# Patient Record
Sex: MALE | Race: BLACK OR AFRICAN AMERICAN | NOT HISPANIC OR LATINO | URBAN - METROPOLITAN AREA
[De-identification: names, ages, dates, MRNs, and addresses within clinical notes are randomized per-mention and may not be internally consistent; named-entity substitution may affect disease eponyms.]

---

## 2018-11-14 ENCOUNTER — EMERGENCY (EMERGENCY)
Facility: HOSPITAL | Age: 38
LOS: 0 days | Discharge: LEFT BEFORE DISCHARGE | End: 2018-11-14
Admitting: EMERGENCY MEDICINE

## 2018-11-14 VITALS
OXYGEN SATURATION: 98 % | SYSTOLIC BLOOD PRESSURE: 134 MMHG | RESPIRATION RATE: 19 BRPM | TEMPERATURE: 98 F | HEART RATE: 74 BPM | DIASTOLIC BLOOD PRESSURE: 68 MMHG

## 2018-11-14 DIAGNOSIS — R07.89 OTHER CHEST PAIN: ICD-10-CM

## 2019-03-23 ENCOUNTER — EMERGENCY (EMERGENCY)
Facility: HOSPITAL | Age: 39
LOS: 0 days | Discharge: LEFT BEFORE DISCHARGE | End: 2019-03-23
Attending: EMERGENCY MEDICINE | Admitting: EMERGENCY MEDICINE

## 2019-03-23 VITALS
RESPIRATION RATE: 18 BRPM | DIASTOLIC BLOOD PRESSURE: 77 MMHG | HEART RATE: 66 BPM | OXYGEN SATURATION: 96 % | TEMPERATURE: 98 F | SYSTOLIC BLOOD PRESSURE: 125 MMHG

## 2019-03-23 DIAGNOSIS — R42 DIZZINESS AND GIDDINESS: ICD-10-CM

## 2019-03-23 NOTE — ED ADULT TRIAGE NOTE - CHIEF COMPLAINT QUOTE
Dizziness, lightheadedness, and blurred vision intermittent for 90 minutes. Neuro status intact. Cleared by critical care attending.

## 2020-10-17 ENCOUNTER — EMERGENCY (EMERGENCY)
Facility: HOSPITAL | Age: 40
LOS: 0 days | Discharge: HOME | End: 2020-10-17
Attending: EMERGENCY MEDICINE | Admitting: EMERGENCY MEDICINE
Payer: MEDICAID

## 2020-10-17 VITALS
HEART RATE: 69 BPM | RESPIRATION RATE: 18 BRPM | DIASTOLIC BLOOD PRESSURE: 60 MMHG | WEIGHT: 201.94 LBS | TEMPERATURE: 98 F | OXYGEN SATURATION: 98 % | SYSTOLIC BLOOD PRESSURE: 107 MMHG

## 2020-10-17 DIAGNOSIS — Z87.891 PERSONAL HISTORY OF NICOTINE DEPENDENCE: ICD-10-CM

## 2020-10-17 DIAGNOSIS — R60.0 LOCALIZED EDEMA: ICD-10-CM

## 2020-10-17 DIAGNOSIS — L03.114 CELLULITIS OF LEFT UPPER LIMB: ICD-10-CM

## 2020-10-17 PROCEDURE — 99283 EMERGENCY DEPT VISIT LOW MDM: CPT

## 2020-10-17 NOTE — ED PROVIDER NOTE - OBJECTIVE STATEMENT
41 yo male with no pertinent pmh present for bug bite to L wrist. pt states he was bit 2 nights prior and initially only noted itching but upon waking up this morning noted edema/erythema. pt denies any pain/discharge from the area. denies any other symptoms including fevers, chill, headache, recent illness/travel, cough, abdominal pain, chest pain, or SOB.

## 2020-10-17 NOTE — ED PROVIDER NOTE - PATIENT PORTAL LINK FT
You can access the FollowMyHealth Patient Portal offered by [facility] by registering at the following website: [email or web address]. By joining [employer]’s FollowMyHealth portal, you will also be able to view your health information using other applications (apps) compatible with our system.

## 2020-10-17 NOTE — ED PROVIDER NOTE - ATTENDING CONTRIBUTION TO CARE
39yo M with no sig PMHx presents for eval of left forearm redness and swelling. Pt states got a bug bite 2 days ago in that area, initially not itchy or painful. Today when he woke up noticed redness and swelling worse, still no pain. Denies fever. Denies numbness, tingling. No h/o travel including to wooded areas.    Vital signs reviewed  GENERAL: Patient nontoxic appearing, NAD  MUSCULOSKELETAL/EXTREMITIES: Brisk cap refill. Equal radial pulses.   SKIN:  Minimal erythema of distal left forearm, volar aspect, with mild swelling, no fluctuance or induration, not hot to touch, nontender. Central area of excoriation. No vesicles or ulceration.

## 2020-10-17 NOTE — ED PROVIDER NOTE - CLINICAL SUMMARY MEDICAL DECISION MAKING FREE TEXT BOX
41yo M with forearm redness and swelling in context of bug bite. Impression is mild cellulitis. Will give ABx. Return precautions given.

## 2020-10-17 NOTE — ED PROVIDER NOTE - PHYSICAL EXAMINATION
Gen: NAD, AOx3  Head: NCAT  HEENT: PERRL, oral mucosa moist, normal conjunctiva, oropharynx clear without exudate or erythema  Lung: CTAB, no respiratory distress, no wheezing, rales, rhonchi  CV: normal s1/s2, rrr, Normal perfusion, pulses 2+ throughout  Abd: soft, NTND, no CVA tenderness  MSK: No edema, no visible deformities, full range of motion in all 4 extremities  Neuro: No focal neurologic deficits  Skin: edema/erythema to L lateral forearm w/ no TTP/drainage   Psych: normal affect

## 2020-11-11 ENCOUNTER — APPOINTMENT (OUTPATIENT)
Dept: CARDIOLOGY | Facility: CLINIC | Age: 40
End: 2020-11-11
Payer: MEDICAID

## 2020-11-11 VITALS
HEART RATE: 65 BPM | HEIGHT: 69 IN | BODY MASS INDEX: 27.25 KG/M2 | DIASTOLIC BLOOD PRESSURE: 80 MMHG | SYSTOLIC BLOOD PRESSURE: 110 MMHG | WEIGHT: 184 LBS

## 2020-11-11 PROBLEM — Z00.00 ENCOUNTER FOR PREVENTIVE HEALTH EXAMINATION: Status: ACTIVE | Noted: 2020-11-11

## 2020-11-11 PROCEDURE — 93000 ELECTROCARDIOGRAM COMPLETE: CPT

## 2020-11-11 PROCEDURE — 99072 ADDL SUPL MATRL&STAF TM PHE: CPT

## 2020-11-11 PROCEDURE — 99203 OFFICE O/P NEW LOW 30 MIN: CPT

## 2020-11-12 DIAGNOSIS — Z91.89 OTHER SPECIFIED PERSONAL RISK FACTORS, NOT ELSEWHERE CLASSIFIED: ICD-10-CM

## 2020-11-19 ENCOUNTER — APPOINTMENT (OUTPATIENT)
Dept: CARDIOLOGY | Facility: CLINIC | Age: 40
End: 2020-11-19
Payer: MEDICAID

## 2020-11-19 PROCEDURE — 93306 TTE W/DOPPLER COMPLETE: CPT

## 2021-10-28 ENCOUNTER — EMERGENCY (EMERGENCY)
Facility: HOSPITAL | Age: 41
LOS: 0 days | Discharge: HOME | End: 2021-10-28
Attending: EMERGENCY MEDICINE | Admitting: EMERGENCY MEDICINE
Payer: MEDICAID

## 2021-10-28 VITALS
SYSTOLIC BLOOD PRESSURE: 128 MMHG | RESPIRATION RATE: 18 BRPM | TEMPERATURE: 97 F | DIASTOLIC BLOOD PRESSURE: 66 MMHG | HEART RATE: 75 BPM | OXYGEN SATURATION: 99 %

## 2021-10-28 VITALS — HEIGHT: 69 IN | WEIGHT: 177.91 LBS

## 2021-10-28 DIAGNOSIS — S91.301A UNSPECIFIED OPEN WOUND, RIGHT FOOT, INITIAL ENCOUNTER: ICD-10-CM

## 2021-10-28 DIAGNOSIS — Z79.891 LONG TERM (CURRENT) USE OF OPIATE ANALGESIC: ICD-10-CM

## 2021-10-28 DIAGNOSIS — R09.89 OTHER SPECIFIED SYMPTOMS AND SIGNS INVOLVING THE CIRCULATORY AND RESPIRATORY SYSTEMS: ICD-10-CM

## 2021-10-28 DIAGNOSIS — B35.3 TINEA PEDIS: ICD-10-CM
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
## 2021-10-28 PROCEDURE — 99284 EMERGENCY DEPT VISIT MOD MDM: CPT

## 2021-10-28 PROCEDURE — 71046 X-RAY EXAM CHEST 2 VIEWS: CPT | Mod: 26

## 2021-10-28 NOTE — ED PROVIDER NOTE - NSFOLLOWUPCLINICS_GEN_ALL_ED_FT
[facility]  Medicine  [address], [place]   Phone: [phone]  Fax:   Follow Up Time: 1-3 Days

## 2021-10-28 NOTE — ED PROVIDER NOTE - PROVIDER TOKENS
FREE:[LAST:[Please make sure to follow up with your dermatologist appointment that you have scheduled],PHONE:[(   )    -],FAX:[(   )    -]]

## 2021-10-28 NOTE — ED PROVIDER NOTE - PHYSICAL EXAMINATION
CONSTITUTIONAL: Well-appearing; well-nourished; in no apparent distress.   HEAD: Normocephalic; atraumatic.   EYES: Pupils are round and reactive, extra-ocular muscles are intact. Eyelids are normal in appearance without swelling or lesions.   ENT: External ears are non-tender and without swelling or erythema. Hearing is intact with good acuity to spoken voice. Patient is speaking clearly, not muffled and airway is intact.   NECK: Neck is supple without adenopathy. Trachea is midline.   RESPIRATORY: Chest wall is symmetric without deformity. No signs of respiratory distress. Lung sounds are clear in all lobes bilaterally without rales, rhonchi, or wheezes.  CARDIOVASCULAR: Regular rate and rhythm. Normal peripheral perfusion.   GI: Abdomen is soft, non-tender, and without distention. Bowel sounds are present and normoactive in all four quadrants. No masses are noted. No rebound, guarding, or rigidity.  BACK: No evidence of trauma or deformity. No midline tenderness. Normal ROM.   EXT: There is a [id number] wound on the medial side of the R foot; there is no active bleeding or drainage or surrounding erythema; motor and sensory function intact with full ROM; distal pulse present. Steady gait noted.  NEURO: A & O x 4. Normal speech. Motor function is normal with good muscle strength to upper and lower extremities. Sensation is intact to all extremities. Cranial nerves are intact.  PSYCHOLOGICAL: Appropriate mood and affect. Good judgement and insight. No visual or auditory hallucinations. No suicidal or homicidal ideation.

## 2021-10-28 NOTE — ED PROVIDER NOTE - NSFOLLOWUPINSTRUCTIONS_ED_ALL_ED_FT
Athlete's Foot       Athlete's foot (tinea pedis) is a fungal infection of the skin on your feet. It often occurs on the skin that is between or underneath the toes. It can also occur on the soles of your feet. The infection can spread from person to person (is contagious). It can also spread when a person's bare feet come in contact with the fungus on shower floors or on items such as shoes.      What are the causes?    This condition is caused by a fungus that grows in warm, moist places. You can get athlete's foot by sharing shoes, shower stalls, towels, and wet floors with someone who is infected. Not washing your feet or changing your socks often enough can also lead to athlete's foot.      What increases the risk?  This condition is more likely to develop in:  •Men.      •People who have a weak body defense system (immune system).      •People who have diabetes.      •People who use public showers, such as at a gym.      •People who wear heavy-duty shoes, such as industrial or  shoes.      •Seasons with warm, humid weather.        What are the signs or symptoms?  Symptoms of this condition include:  •Itchy areas between your toes or on the soles of your feet.      •White, flaky, or scaly areas between your toes or on the soles of your feet.      •Very itchy small blisters between your toes or on the soles of your feet.      •Small cuts in your skin. These cuts can become infected.      •Thick or discolored toenails.        How is this diagnosed?    This condition may be diagnosed with a physical exam and a review of your medical history. Your health care provider may also take a skin or toenail sample to examine under a microscope.      How is this treated?    This condition is treated with antifungal medicines. These may be applied as powders, ointments, or creams. In severe cases, an oral antifungal medicine may be given.      Follow these instructions at home:    Medicines     •Apply or take over-the-counter and prescription medicines only as told by your health care provider.      •Apply your antifungal medicine as told by your health care provider. Do not stop using the antifungal even if your condition improves.      Foot care     • Do not scratch your feet.    •Keep your feet dry:  •Wear cotton or wool socks. Change your socks every day or if they become wet.      •Wear shoes that allow air to flow, such as sandals or canvas tennis shoes.      •Wash and dry your feet, including the area between your toes. Also, wash and dry your feet:  •Every day or as told by your health care provider.      •After exercising.        General instructions     • Do not let others use towels, shoes, nail clippers, or other personal items that touch your feet.      •Protect your feet by wearing sandals in wet areas, such as locker rooms and shared showers.      •Keep all follow-up visits as told by your health care provider. This is important.      •If you have diabetes, keep your blood sugar under control.        Contact a health care provider if:    •You have a fever.      •You have swelling, soreness, warmth, or redness in your foot.      •Your feet are not getting better with treatment.      •Your symptoms get worse.      •You have new symptoms.        Summary    •Athlete's foot (tinea pedis) is a fungal infection of the skin on your feet. It often occurs on skin that is between or underneath the toes.      •This condition is caused by a fungus that grows in warm, moist places.      •Symptoms include white, flaky, or scaly areas between your toes or on the soles of your feet.      •This condition is treated with antifungal medicines.      •Keep your feet clean. Always dry them thoroughly.      This information is not intended to replace advice given to you by your health care provider. Make sure you discuss any questions you have with your health care provider.
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
## 2021-10-28 NOTE — ED PROVIDER NOTE - OBJECTIVE STATEMENT
42 y/o male with no significant PMH who presents with chest congestion and right foot wound. 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 Reports that he started this job 2 months ago. Reports that 3 days ago he was wearing a regular mask instead of his usual mask for his job. Reports afterwards noticing chest congestion since then. Denies fever, headache, lightheadedness, SOB, chest pain, N/V, and abdominal pain. Also reports that he was using water to flush the trash debris almost 1 month ago and got water into his right shoe. Reports developing a wound a few days after. Reports that only feels pain when his wears his shoes and rubs on it. Denies active bleeding, discharge, and other symptoms. Reports that he used hydrogen peroxide to clean it, but it has not improved.

## 2021-10-28 NOTE — ED PROVIDER NOTE - PATIENT PORTAL LINK FT
You can access the FollowMyHealth Patient Portal offered by [facility] by registering at the following website: [email or web address]. By joining [employer]’s FollowMyHealth portal, you will also be able to view your health information using other applications (apps) compatible with our system.

## 2021-10-28 NOTE — ED ADULT NURSE NOTE - OBJECTIVE STATEMENT
pt comes in with multiple medical complaints. pt states he is feeling congestion after being exposed to chemicals from work. also states he has had trouble using the bathroom. took over the counter medications with no relief. LBM this morning. Pt also has a rash/injury on his right lateral foot, open to air, treated at home with peroxide.

## 2021-10-28 NOTE — ED PROVIDER NOTE - CARE PROVIDER_API CALL
Please make sure to follow up with your dermatologist appointment that you have scheduled,   Phone: (   )    -  Fax: (   )    -  Follow Up Time:

## 2021-10-28 NOTE — ED PROVIDER NOTE - NS ED ROS FT
Constitutional: Negative for fever, chills, weight change, and fatigue.  HENT: Negative for headache, hearing change, ear pain, nasal congestion, and sore throat.  Eyes: Negative for eye pain, eye discharge, foreign body sensation, and vision change.  Cardiovascular: Negative for chest pain, palpitation, and orthopnea.  Respiratory: + chest congestion. Negative for SOB, wheezing, cough and sputum production.  Gastrointestinal: Negative for nausea, vomiting, abdominal pain, constipation, diarrhea, hematochezia, and melena.  Genitourinary: Negative for flank pain, dysuria, urgency, frequency, hematuria, and urinary retention.  Neurological: Negative for dizziness, syncope, focal weakness, numbness, and loss of consciousness.  Musculoskeletal: + wound on R foot. Negative for joint swelling, arthralgias, back pain, neck pain, and calf cramps.  Hematological: Negative for adenopathy. Does not bruise/bleed easily.  Psychiatric/Behavioral: Negative for anxiety/panic, depression, delusions, SI/HI, and memory changes.

## 2021-10-28 NOTE — ED PROVIDER NOTE - CLINICAL SUMMARY MEDICAL DECISION MAKING FREE TEXT BOX
42yo M presenting with R foot wound/rash- states he had moisture in area, developed rash, now improving but still present. has appt with derm next month. also c/o some chest congestion likely also work related per pt. no fevers/chills, cp. Well appearing, NAD, non toxic. NCAT PERRLA EOMI neck supple non tender normal wob cta bl rrr abdomen s nt nd no rebound no guarding [id number] neuro non focal R medial foot dry scaly rash, no surrounding erythema warmth tenderness crepitus induration drainage

## 2021-11-10 ENCOUNTER — EMERGENCY (EMERGENCY)
Facility: HOSPITAL | Age: 41
LOS: 0 days | Discharge: HOME | End: 2021-11-10
Attending: EMERGENCY MEDICINE | Admitting: EMERGENCY MEDICINE
Payer: MEDICAID

## 2021-11-10 VITALS
DIASTOLIC BLOOD PRESSURE: 72 MMHG | HEART RATE: 72 BPM | SYSTOLIC BLOOD PRESSURE: 139 MMHG | WEIGHT: 179.9 LBS | HEIGHT: 69 IN | OXYGEN SATURATION: 98 % | RESPIRATION RATE: 17 BRPM | TEMPERATURE: 98 F

## 2021-11-10 DIAGNOSIS — B35.3 TINEA PEDIS: ICD-10-CM

## 2021-11-10 DIAGNOSIS — R21 RASH AND OTHER NONSPECIFIC SKIN ERUPTION: ICD-10-CM

## 2021-11-10 PROCEDURE — 99282 EMERGENCY DEPT VISIT SF MDM: CPT

## 2021-11-10 NOTE — ED PROVIDER NOTE - PHYSICAL EXAMINATION
CONST: Well appearing in NAD  MS: Normal ROM in all extremities. No midline spinal tenderness.  SKIN: right foot plantar surface with circular rash with sharp raised borders and centered crusting and peelng skin c/e fungal rash. no cellulitis or abscess  NEURO: A&Ox3, No focal deficits. Strength 5/5 with no sensory deficits. Steady gait

## 2021-11-10 NOTE — ED PROVIDER NOTE - NS ED ROS FT
CONST: No fever, chills or bodyaches  EYES: No pain, redness, drainage or visual changes.  ENT: No ear pain or discharge, nasal discharge or congestion. No sore throat  CARD: No chest pain, palpitations  RESP: No SOB, cough, hemoptysis.   GI: No abdominal pain, N/V/D  NEURO: No headache, dizziness, paresthesias or LOC

## 2021-11-10 NOTE — ED ADULT TRIAGE NOTE - CHIEF COMPLAINT QUOTE
pt states that he was seen for a rash on his right foot last week and given cream but now the skin is raw and falling off.

## 2021-11-10 NOTE — ED PROVIDER NOTE - OBJECTIVE STATEMENT
rash to right foot x 2 weeks. Some improvement with Clotrimazole cream but the dressings stick to the wound and causes irritation. Denies fever, chills. Has appt with dermatology at the end of november

## 2021-11-10 NOTE — ED PROVIDER NOTE - PATIENT PORTAL LINK FT
You can access the FollowMyHealth Patient Portal offered by [facility] by registering at the following website: [email or web address]. By joining [employer]’s FollowMyHealth portal, you will also be able to view your health information using other applications (apps) compatible with our system.

## 2021-12-30 ENCOUNTER — EMERGENCY (EMERGENCY)
Facility: HOSPITAL | Age: 41
LOS: 0 days | Discharge: HOME | End: 2021-12-30
Attending: STUDENT IN AN ORGANIZED HEALTH CARE EDUCATION/TRAINING PROGRAM | Admitting: STUDENT IN AN ORGANIZED HEALTH CARE EDUCATION/TRAINING PROGRAM
Payer: OTHER MISCELLANEOUS

## 2021-12-30 VITALS
HEIGHT: 69 IN | DIASTOLIC BLOOD PRESSURE: 77 MMHG | HEART RATE: 68 BPM | TEMPERATURE: 97 F | SYSTOLIC BLOOD PRESSURE: 131 MMHG | RESPIRATION RATE: 20 BRPM | WEIGHT: 169.98 LBS | OXYGEN SATURATION: 98 %

## 2021-12-30 DIAGNOSIS — R21 RASH AND OTHER NONSPECIFIC SKIN ERUPTION: ICD-10-CM

## 2021-12-30 PROBLEM — Z78.9 OTHER SPECIFIED HEALTH STATUS: Chronic | Status: ACTIVE | Noted: 2021-11-10

## 2021-12-30 PROCEDURE — 99284 EMERGENCY DEPT VISIT MOD MDM: CPT

## 2021-12-30 NOTE — ED PROVIDER NOTE - NSFOLLOWUPINSTRUCTIONS_ED_ALL_ED_FT
Please follow up with your dermatologist in 1-3 days for further evaluation and management of your rash. Continue using the aquaphor if it helps.     We will call you if there is a positive result on your bloodwork. Return to the emergency department sooner for any new or worsening symptoms.       Rash    A rash is a change in the color of the skin. A rash can also change the way your skin feels. There are many different conditions and factors that can cause a rash, most of which are not dangerous. Make sure to follow up with your primary care physician or a dermatologist as instructed by your health care provider.    SEEK IMMEDIATE MEDICAL CARE IF YOU HAVE THE FOLLOWING SYMPTOMS: fever, blisters, a rash inside your mouth, or altered mental status.

## 2021-12-30 NOTE — ED ADULT NURSE NOTE - NSIMPLEMENTINTERV_GEN_ALL_ED
Implemented All Universal Safety Interventions:  [place] to call system. Call bell, personal items and telephone within reach. Instruct patient to call for assistance. Room bathroom lighting operational. Non-slip footwear when patient is off stretcher. Physically safe environment: no spills, clutter or unnecessary equipment. Stretcher in lowest position, wheels locked, appropriate side rails in place.

## 2021-12-30 NOTE — ED PROVIDER NOTE - PATIENT PORTAL LINK FT
You can access the FollowMyHealth Patient Portal offered by [facility] by registering at the following website: [email or web address]. By joining [employer]’s FollowMyHealth portal, you will also be able to view your health information using other applications (apps) compatible with our system.

## 2021-12-30 NOTE — ED ADULT NURSE NOTE - OBJECTIVE STATEMENT
psoriasistopicalointmentpeelingskin Pt seen by podiatrist for possible psoriasis on foot, has been taking topical cream. Hands have b/l peeling of epidermis
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
## 2021-12-30 NOTE — ED PROVIDER NOTE - PHYSICAL EXAMINATION
Vital Signs: Reviewed  GEN: alert, NAD, speaks full sentences  HEAD:  normocephalic, atraumatic  EYES:  PERRLA; conjunctivae without injection, drainage or discharge  ENMT:  nasal mucosa moist; mouth moist without ulcerations or lesions; throat moist without erythema, exudate, ulcerations or lesions  NECK:  supple  CARDIAC:  regular rate, normal S1 and S2, no murmurs  RESP:  respiratory rate and effort appear normal for age; lungs are clear to auscultation bilaterally; no rales or wheezes  ABDOMEN:  soft, nontender, nondistended  MUSCULOSKELETAL/NEURO:  normal movement, normal tone  SKIN: dry peeling skin, scattered brown macules to b/l palms, non-tender; otherwise normal skin color for age and race, well-perfused; warm and dry
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
## 2021-12-31 LAB — T PALLIDUM AB TITR SER: NEGATIVE — SIGNIFICANT CHANGE UP

## 2022-04-19 ENCOUNTER — APPOINTMENT (OUTPATIENT)
Dept: CARDIOLOGY | Facility: CLINIC | Age: 42
End: 2022-04-19

## 2022-06-07 ENCOUNTER — APPOINTMENT (OUTPATIENT)
Dept: PULMONOLOGY | Facility: CLINIC | Age: 42
End: 2022-06-07

## 2022-09-12 NOTE — ED ADULT TRIAGE NOTE - PRO INTERPRETER NEED 2
English Admission Reconciliation is Not Complete  Discharge Reconciliation is Not Complete Admission Reconciliation is Completed  Discharge Reconciliation is Not Complete Admission Reconciliation is Completed  Discharge Reconciliation is Completed

## 2022-09-20 ENCOUNTER — APPOINTMENT (OUTPATIENT)
Dept: NEUROLOGY | Facility: CLINIC | Age: 42
End: 2022-09-20

## 2022-09-20 ENCOUNTER — NON-APPOINTMENT (OUTPATIENT)
Age: 42
End: 2022-09-20

## 2022-09-20 DIAGNOSIS — R20.2 PARESTHESIA OF SKIN: ICD-10-CM

## 2022-09-20 PROCEDURE — 99072 ADDL SUPL MATRL&STAF TM PHE: CPT

## 2022-09-20 PROCEDURE — 99075 MEDICAL TESTIMONY: CPT

## 2022-10-01 NOTE — ED ADULT TRIAGE NOTE - ESI TRIAGE ACUITY LEVEL, MLM
Pt to ER via triage with c/o headache and strong sinus pressure x2 days. Pt also states right flank pain with dark urine x4 days. Denies pain or burning.   
3

## 2022-11-16 ENCOUNTER — APPOINTMENT (OUTPATIENT)
Dept: CARDIOLOGY | Facility: CLINIC | Age: 42
End: 2022-11-16

## 2022-12-02 NOTE — ED PROVIDER NOTE - CLINICAL SUMMARY MEDICAL DECISION MAKING FREE TEXT BOX
Patient called to confirm she may drink Enterade ( a plant based medical food drink) while on capecitabine. I informed her that Enterade is safe to take while on capecitabine. Patient expressed understanding.       
42 yo male presenting for evaluation of skin rash on his rt foot x 1 month.  As per patient the rash is improving with antifungal cream, he denies any other additional complaints.  He has a dermatology appointment  coming up later this month .  Well-appearing male in NAD, there is a 5-6 cm circular rash on the instep of the rt foot without drainage or surrounding cellulitis / abscess.  Advised to continue with cream and to keep his appointment with derm.  Strict return precautions given.

## 2022-12-22 ENCOUNTER — APPOINTMENT (OUTPATIENT)
Dept: CARDIOLOGY | Facility: CLINIC | Age: 42
End: 2022-12-22

## 2023-01-03 ENCOUNTER — APPOINTMENT (OUTPATIENT)
Dept: CARDIOLOGY | Facility: CLINIC | Age: 43
End: 2023-01-03
Payer: MEDICAID

## 2023-01-03 VITALS
HEART RATE: 66 BPM | DIASTOLIC BLOOD PRESSURE: 70 MMHG | WEIGHT: 174 LBS | SYSTOLIC BLOOD PRESSURE: 104 MMHG | HEIGHT: 73 IN | BODY MASS INDEX: 23.06 KG/M2

## 2023-01-03 PROCEDURE — 99214 OFFICE O/P EST MOD 30 MIN: CPT | Mod: 25

## 2023-01-03 PROCEDURE — 93000 ELECTROCARDIOGRAM COMPLETE: CPT

## 2023-01-03 NOTE — HISTORY OF PRESENT ILLNESS
[FreeTextEntry1] : This is a 43-year-old, male who complains of atypical chest pain.\par The patient denies a history of atherosclerotic heart disease, exertional chest pain consistent with angina pectoris or previous myocardial infarction.\par There is no evidence of congestive heart failure.  \par There is no evidence of valvular heart disease, murmur, presyncope or syncope\par The patient denies a TIA, CVA or peripheral vascular disease\par \par There is no shortness of breath or element of pulmonary disease.\par He describes atypical chest pain, not related to exertion, sometimes at rest or activity but no associated shortness of breath or constitutional symptoms.\par \par EKG is normal sinus rhythm\par

## 2023-01-30 ENCOUNTER — APPOINTMENT (OUTPATIENT)
Dept: CARDIOLOGY | Facility: CLINIC | Age: 43
End: 2023-01-30
Payer: MEDICAID

## 2023-01-30 DIAGNOSIS — F17.200 NICOTINE DEPENDENCE, UNSPECIFIED, UNCOMPLICATED: ICD-10-CM

## 2023-01-30 PROCEDURE — 93015 CV STRESS TEST SUPVJ I&R: CPT

## 2023-01-30 PROCEDURE — 93306 TTE W/DOPPLER COMPLETE: CPT

## 2023-01-31 PROBLEM — F17.200 CURRENT SMOKER: Status: ACTIVE | Noted: 2023-01-31

## 2023-02-23 ENCOUNTER — APPOINTMENT (OUTPATIENT)
Dept: CARDIOLOGY | Facility: CLINIC | Age: 43
End: 2023-02-23
Payer: MEDICAID

## 2023-02-23 VITALS
HEIGHT: 73 IN | SYSTOLIC BLOOD PRESSURE: 110 MMHG | DIASTOLIC BLOOD PRESSURE: 74 MMHG | BODY MASS INDEX: 22.8 KG/M2 | TEMPERATURE: 97.5 F | WEIGHT: 172 LBS

## 2023-02-23 DIAGNOSIS — R07.9 CHEST PAIN, UNSPECIFIED: ICD-10-CM

## 2023-02-23 PROCEDURE — 99214 OFFICE O/P EST MOD 30 MIN: CPT

## 2023-02-23 NOTE — HISTORY OF PRESENT ILLNESS
[FreeTextEntry1] : This is a 43-year-old, male who complains of atypical chest pain.\par The patient denies a history of atherosclerotic heart disease, exertional chest pain consistent with angina pectoris or previous myocardial infarction.\par There is no evidence of congestive heart failure.  \par There is no evidence of valvular heart disease, murmur, presyncope or syncope\par The patient denies a TIA, CVA or peripheral vascular disease\par \par There is no shortness of breath or element of pulmonary disease.\par He describes atypical chest pain, not related to exertion, sometimes at rest or activity but no associated shortness of breath or constitutional symptoms.\par \par Today the patient offers no cardiac complaints\par He denies chest pain or exertional shortness of breath\par \par EKG is normal sinus rhythm\par Echo was done which was normal and EF is normal and no significant valvular heart disease\par Stress test was normal no evidence of ischemia\par \par

## 2023-03-01 ENCOUNTER — APPOINTMENT (OUTPATIENT)
Dept: OTOLARYNGOLOGY | Facility: CLINIC | Age: 43
End: 2023-03-01

## 2024-01-10 ENCOUNTER — EMERGENCY (EMERGENCY)
Facility: HOSPITAL | Age: 44
LOS: 0 days | Discharge: HOME | End: 2024-01-10
Attending: EMERGENCY MEDICINE
Payer: MEDICAID

## 2024-01-10 VITALS
WEIGHT: 169.98 LBS | HEART RATE: 73 BPM | SYSTOLIC BLOOD PRESSURE: 138 MMHG | DIASTOLIC BLOOD PRESSURE: 61 MMHG | TEMPERATURE: 98 F | RESPIRATION RATE: 17 BRPM

## 2024-01-10 VITALS
SYSTOLIC BLOOD PRESSURE: 131 MMHG | DIASTOLIC BLOOD PRESSURE: 73 MMHG | HEART RATE: 67 BPM | OXYGEN SATURATION: 97 % | RESPIRATION RATE: 20 BRPM
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
## 2024-01-10 DIAGNOSIS — R10.13 EPIGASTRIC PAIN: ICD-10-CM
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
## 2024-01-10 DIAGNOSIS — R07.81 PLEURODYNIA: ICD-10-CM

## 2024-01-10 DIAGNOSIS — F17.200 NICOTINE DEPENDENCE, UNSPECIFIED, UNCOMPLICATED: ICD-10-CM

## 2024-01-10 PROCEDURE — 99284 EMERGENCY DEPT VISIT MOD MDM: CPT

## 2024-01-10 PROCEDURE — 99283 EMERGENCY DEPT VISIT LOW MDM: CPT | Mod: 25

## 2024-01-10 PROCEDURE — 71046 X-RAY EXAM CHEST 2 VIEWS: CPT

## 2024-01-10 PROCEDURE — 71046 X-RAY EXAM CHEST 2 VIEWS: CPT | Mod: 26

## 2024-01-10 NOTE — ED PROVIDER NOTE - OBJECTIVE STATEMENT
43-year-old male no past medical history presents to the ED complaining of left lower rib discomfort status post accidentally hitting self on dual cage while leaning to get a bowl about a month ago.  Patient states source PMD was told recommend that he follow-up with GI.  Patient denies any chest pain, shortness of breath, abdominal pain, nausea, vomiting or diarrhea.

## 2024-01-10 NOTE — ED PROVIDER NOTE - PATIENT PORTAL LINK FT
You can access the FollowMyHealth Patient Portal offered by [facility] by registering at the following website: [email or web address]. By joining [facility]’s FollowMyHealth portal, you will also be able to view your health information using other applications (apps) compatible with our system. You can access the FollowMyHealth Patient Portal offered by [facility] by registering at the following website: [email or web address]. By joining [employer]’s FollowMyHealth portal, you will also be able to view your health information using other applications (apps) compatible with our system.

## 2024-01-10 NOTE — ED PROVIDER NOTE - TEMPLATE, MLM
How Severe Are Your Spot(S)?: moderate
What Is The Reason For Today's Visit?: Annual Full Body Skin Examination with No Concerns
What Is The Reason For Today's Visit? (Being Monitored For X): concerning skin lesions on an annual basis
General

## 2024-01-10 NOTE — ED ADULT TRIAGE NOTE - CHIEF COMPLAINT QUOTE
Pt lio c/o left lower rib pain . reports hit into a dog cage ~1month ago and " has pain when you touch on it "

## 2024-01-10 NOTE — ED PROVIDER NOTE - CLINICAL SUMMARY MEDICAL DECISION MAKING FREE TEXT BOX
33-year-old male presenting for evaluation of rib pain, epigastric pain.  States that this occurred after accidentally injuring the area against a dog cage approximately 1 month ago.  Initially had some bruising, resolved, now noting some pain.  No other acute complaints.  No nausea vomiting diarrhea.  No chest pain, shortness of breath.  Well appearing, NAD, non toxic. NCAT PERRLA EOMI neck supple non tender normal wob cta bl rrr abdomen s nt nd no rebound no guarding WWPx4 neuro non focal.  Imaging reviewed.  Comfortable with discharge and follow-up outpatient, strict return precautions given. Endorses understanding of all of this and aware that they can return at any time for new or concerning symptoms. No further questions or concerns at this time

## 2024-01-27 ENCOUNTER — EMERGENCY (EMERGENCY)
Facility: HOSPITAL | Age: 44
LOS: 0 days | Discharge: HOME | End: 2024-01-27
Attending: EMERGENCY MEDICINE
Payer: MEDICAID

## 2024-01-27 VITALS
DIASTOLIC BLOOD PRESSURE: 84 MMHG | HEART RATE: 68 BPM | SYSTOLIC BLOOD PRESSURE: 128 MMHG | OXYGEN SATURATION: 98 % | RESPIRATION RATE: 18 BRPM

## 2024-01-27 VITALS
OXYGEN SATURATION: 97 % | TEMPERATURE: 98 F | SYSTOLIC BLOOD PRESSURE: 139 MMHG | RESPIRATION RATE: 18 BRPM | DIASTOLIC BLOOD PRESSURE: 94 MMHG | WEIGHT: 171.96 LBS | HEART RATE: 63 BPM

## 2024-01-27 DIAGNOSIS — R51.9 HEADACHE, UNSPECIFIED: ICD-10-CM

## 2024-01-27 LAB
ALBUMIN SERPL ELPH-MCNC: 4.5 G/DL — SIGNIFICANT CHANGE UP (ref 3.5–5.2)
ALP SERPL-CCNC: 126 U/L — HIGH (ref 30–115)
ALT FLD-CCNC: 12 U/L — SIGNIFICANT CHANGE UP (ref 0–41)
ANION GAP SERPL CALC-SCNC: 9 MMOL/L — SIGNIFICANT CHANGE UP (ref 7–14)
AST SERPL-CCNC: 19 U/L — SIGNIFICANT CHANGE UP (ref 0–41)
BASOPHILS # BLD AUTO: 0.05 K/UL — SIGNIFICANT CHANGE UP (ref 0–0.2)
BASOPHILS NFR BLD AUTO: 0.9 % — SIGNIFICANT CHANGE UP (ref 0–1)
BILIRUB SERPL-MCNC: 0.3 MG/DL — SIGNIFICANT CHANGE UP (ref 0.2–1.2)
BUN SERPL-MCNC: 11 MG/DL — SIGNIFICANT CHANGE UP (ref 10–20)
CALCIUM SERPL-MCNC: 9.2 MG/DL — SIGNIFICANT CHANGE UP (ref 8.4–10.5)
CHLORIDE SERPL-SCNC: 100 MMOL/L — SIGNIFICANT CHANGE UP (ref 98–110)
CO2 SERPL-SCNC: 24 MMOL/L — SIGNIFICANT CHANGE UP (ref 17–32)
CREAT SERPL-MCNC: 1 MG/DL — SIGNIFICANT CHANGE UP (ref 0.7–1.5)
EGFR: 96 ML/MIN/1.73M2 — SIGNIFICANT CHANGE UP
EOSINOPHIL # BLD AUTO: 0.16 K/UL — SIGNIFICANT CHANGE UP (ref 0–0.7)
EOSINOPHIL NFR BLD AUTO: 2.8 % — SIGNIFICANT CHANGE UP (ref 0–8)
GLUCOSE SERPL-MCNC: 84 MG/DL — SIGNIFICANT CHANGE UP (ref 70–99)
HCT VFR BLD CALC: 46.6 % — SIGNIFICANT CHANGE UP (ref 42–52)
HGB BLD-MCNC: 15.6 G/DL — SIGNIFICANT CHANGE UP (ref 14–18)
IMM GRANULOCYTES NFR BLD AUTO: 0.3 % — SIGNIFICANT CHANGE UP (ref 0.1–0.3)
LYMPHOCYTES # BLD AUTO: 2.19 K/UL — SIGNIFICANT CHANGE UP (ref 1.2–3.4)
LYMPHOCYTES # BLD AUTO: 38.1 % — SIGNIFICANT CHANGE UP (ref 20.5–51.1)
MCHC RBC-ENTMCNC: 30.7 PG — SIGNIFICANT CHANGE UP (ref 27–31)
MCHC RBC-ENTMCNC: 33.5 G/DL — SIGNIFICANT CHANGE UP (ref 32–37)
MCV RBC AUTO: 91.7 FL — SIGNIFICANT CHANGE UP (ref 80–94)
MONOCYTES # BLD AUTO: 0.46 K/UL — SIGNIFICANT CHANGE UP (ref 0.1–0.6)
MONOCYTES NFR BLD AUTO: 8 % — SIGNIFICANT CHANGE UP (ref 1.7–9.3)
NEUTROPHILS # BLD AUTO: 2.87 K/UL — SIGNIFICANT CHANGE UP (ref 1.4–6.5)
NEUTROPHILS NFR BLD AUTO: 49.9 % — SIGNIFICANT CHANGE UP (ref 42.2–75.2)
NRBC # BLD: 0 /100 WBCS — SIGNIFICANT CHANGE UP (ref 0–0)
PLATELET # BLD AUTO: 175 K/UL — SIGNIFICANT CHANGE UP (ref 130–400)
PMV BLD: 12.3 FL — HIGH (ref 7.4–10.4)
POTASSIUM SERPL-MCNC: 4.9 MMOL/L — SIGNIFICANT CHANGE UP (ref 3.5–5)
POTASSIUM SERPL-SCNC: 4.9 MMOL/L — SIGNIFICANT CHANGE UP (ref 3.5–5)
PROT SERPL-MCNC: 7.5 G/DL — SIGNIFICANT CHANGE UP (ref 6–8)
RBC # BLD: 5.08 M/UL — SIGNIFICANT CHANGE UP (ref 4.7–6.1)
RBC # FLD: 13.2 % — SIGNIFICANT CHANGE UP (ref 11.5–14.5)
SODIUM SERPL-SCNC: 133 MMOL/L — LOW (ref 135–146)
WBC # BLD: 5.75 K/UL — SIGNIFICANT CHANGE UP (ref 4.8–10.8)
WBC # FLD AUTO: 5.75 K/UL — SIGNIFICANT CHANGE UP (ref 4.8–10.8)

## 2024-01-27 PROCEDURE — 70450 CT HEAD/BRAIN W/O DYE: CPT | Mod: MA

## 2024-01-27 PROCEDURE — 36415 COLL VENOUS BLD VENIPUNCTURE: CPT

## 2024-01-27 PROCEDURE — 70450 CT HEAD/BRAIN W/O DYE: CPT | Mod: 26,MA

## 2024-01-27 PROCEDURE — 96374 THER/PROPH/DIAG INJ IV PUSH: CPT

## 2024-01-27 PROCEDURE — 99284 EMERGENCY DEPT VISIT MOD MDM: CPT | Mod: 25

## 2024-01-27 PROCEDURE — 99284 EMERGENCY DEPT VISIT MOD MDM: CPT

## 2024-01-27 PROCEDURE — 85025 COMPLETE CBC W/AUTO DIFF WBC: CPT

## 2024-01-27 PROCEDURE — 80053 COMPREHEN METABOLIC PANEL: CPT

## 2024-01-27 RX ORDER — METOCLOPRAMIDE HCL 10 MG
10 TABLET ORAL ONCE
Refills: 0 | Status: COMPLETED | OUTPATIENT
Start: 2024-01-27 | End: 2024-01-27

## 2024-01-27 RX ORDER — SODIUM CHLORIDE 9 MG/ML
1000 INJECTION INTRAMUSCULAR; INTRAVENOUS; SUBCUTANEOUS ONCE
Refills: 0 | Status: COMPLETED | OUTPATIENT
Start: 2024-01-27 | End: 2024-01-27

## 2024-01-27 RX ORDER — ACETAMINOPHEN 500 MG
650 TABLET ORAL ONCE
Refills: 0 | Status: COMPLETED | OUTPATIENT
Start: 2024-01-27 | End: 2024-01-27

## 2024-01-27 RX ADMIN — SODIUM CHLORIDE 1000 MILLILITER(S): 9 INJECTION INTRAMUSCULAR; INTRAVENOUS; SUBCUTANEOUS at 10:10

## 2024-01-27 RX ADMIN — Medication 104 MILLIGRAM(S): at 09:54

## 2024-01-27 RX ADMIN — Medication 650 MILLIGRAM(S): at 09:54

## 2024-01-27 NOTE — ED PROVIDER NOTE - PHYSICAL EXAMINATION
CONST: Well appearing in NAD  EYES: PERRL, EOMI, Sclera and conjunctiva clear.   ENT: No nasal discharge. Oropharynx normal appearing  NECK: Non-tender, no meningeal signs. normal ROM. supple   CARD: S1 S2; No jvd  RESP: Equal BS B/L, No wheezes, rhonchi or rales. No distress  GI: Soft, non-tender, non-distended. normal BS  MS: Normal ROM in all extremities. pulses 2 +. no calf tenderness or swelling  SKIN: Warm, dry, no acute rashes. Good turgor  NEURO: A&Ox3, No focal deficits. Strength 5/5 with no sensory deficits. Steady gait. Finger to nose intact. Negative pronator drift

## 2024-01-27 NOTE — ED ADULT TRIAGE NOTE - CHIEF COMPLAINT QUOTE
pt stated he felt a funny feeling in his head after running 2 weeks ago and has had migraine type headaches for 2 weeks accompanied by nausea.

## 2024-01-27 NOTE — ED PROVIDER NOTE - CLINICAL SUMMARY MEDICAL DECISION MAKING FREE TEXT BOX
43-year-old male with intermittent headache on the right for a few weeks.  No constitutional symptoms, no focal neurodeficits.  Vital signs reviewed and are normal.  Labs were ordered and reviewed, no imaging is negative, patient was given fluids, analgesia reported feeling much better.  Pain likely due to cervical radiculopathy, anticipatory guidance provided, patient was advised to follow-up  outpatient, strict return precautions given.  He was given opportunity to ask questions, verbalized understanding is amenable to discharge plan.

## 2024-01-27 NOTE — ED ADULT NURSE NOTE - OBJECTIVE STATEMENT
Patient endorses mild right-sided headache described as a cold sensation that began after running in place x 2 weeks ago

## 2024-01-27 NOTE — ED PROVIDER NOTE - PATIENT PORTAL LINK FT
You can access the FollowMyHealth Patient Portal offered by [facility] by registering at the following website: [email or web address]. By joining [employer]’s FollowMyHealth portal, you will also be able to view your health information using other applications (apps) compatible with our system.

## 2024-01-27 NOTE — ED PROVIDER NOTE - CCCP TRG CHIEF CMPLNT
ED ADULT NOTE    Patient interviewed and examined.      Chief Complaint   Patient presents with   • Cellulitis        Time seen by physician: 911    History limitation: None    PPE: Patient did have mask on entire stay in the emergency department.    Additionally, I did wear recommended personal protective equipment, including face mask, eye protection, and gloves when initially assessing patient and when re-examining.     HPI: 70-year-old male with a history of diabetes, hyperlipidemia, depression, GERD, bipolar disorder, traumatic brain injury, here today with cellulitis. Patient has a history of chronic skin changes and venous insufficiency. He was here in November for leg edema and cellulitis. Starting today the left leg became significantly more red than the right leg and warm as well. Patient has no numbness or weakness. No cool sensation. No significant pain to the skin. No fevers.    Review of Systems   Positive ROS: see above  Negative ROS: 10 systems reviewed and negative, other than what is noted      PAST MEDICAL HX:    Diabetes mellitus (CMS/HCC)                                   Patient Active Problem List   Diagnosis   • Acute diastolic (congestive) heart failure (CMS/HCC)   • Cellulitis   • Bipolar disorder (CMS/HCC)   • H/O traumatic brain injury   • Hyponatremia   • Acute-on-chronic kidney injury (CMS/HCC)   • Bilateral leg edema   • Tremor   • Diabetes (CMS/HCC)       PAST SURGICAL HX:    HERNIA REPAIR                                                 No family history on file.  Review of patient's family status indicates:  No family status on file      Social History     Tobacco Use   • Smoking status: Former Smoker   • Smokeless tobacco: Former User   Vaping Use   • Vaping Use: never used   Substance Use Topics   • Alcohol use: Not Currently   • Drug use: Not Currently     Types: Marijuana        Physical Exam:     Vitals:    02/26/22 2142 02/26/22 2143   BP:  109/49   Pulse: 88 84   Resp: 16     Temp: 99.5 °F (37.5 °C)    TempSrc: Oral    SpO2: 97% 96%          Physical Exam  Constitutional:       Appearance: Normal appearance. He is not ill-appearing.   HENT:      Head: Normocephalic and atraumatic.      Nose: Nose normal.      Neck: Neck supple.   Eyes:      Extraocular Movements: Extraocular movements intact.      Conjunctiva/sclera: Conjunctivae normal.   Cardiovascular:      Rate and Rhythm: Normal rate and regular rhythm.   Pulmonary:      Effort: Pulmonary effort is normal.   Abdominal:      General: There is no distension.   Musculoskeletal:      Comments: Bilateral peripheral edema with chronic skin darkening. With bilateral symmetric edema  Normal DP/PT pulses and skin temperature. With red hue to left lower extremity with redness tracking up to just distal to knee anteriorly . No crepitance. No pain out of proportion.    Skin:     General: Skin is warm and dry.   Neurological:      Mental Status: He is alert and oriented to person, place, and time.   Psychiatric:         Behavior: Behavior normal.         Judgment: Judgment normal.          The case was reviewed with the patient. Nursing notes reviewed.    MDM: 70-year-old male with a history of diabetes, hyperlipidemia, depression, GERD, bipolar disorder, traumatic brain injury, here today with cellulitis. Vitals okay. Vancomycin ordered. Basic labs, us, xray ordered         Results for orders placed or performed during the hospital encounter of 02/26/22   Basic Metabolic Panel   Result Value    Fasting Status     Sodium 137    Potassium 4.3    Chloride 101    Carbon Dioxide 29    Anion Gap 11    Glucose 81    BUN 31 (H)    Creatinine 1.70 (H)    Glomerular Filtration Rate 40 (L)     Comment: eGFR 30-59 mL/min/1.73m2 = Moderate decrease in kidney function. Stage 3 CKD (chronic kidney disease) or moderate kidney disease. Estimated GFR calculated using the 2009 CKD-EPI creatinine equation.    BUN/ Creatinine Ratio 18    Calcium 9.6   NT proBNP    Result Value    NT-proBNP 184 (H)   CBC with Automated Differential (performable only)   Result Value    WBC 8.0    RBC 4.94    HGB 15.7    HCT 47.4    MCV 96.0    MCH 31.8    MCHC 33.1    RDW-CV 14.3    RDW-SD 50.1 (H)        NRBC 0    Neutrophil, Percent 66    Lymphocytes, Percent 20    Mono, Percent 12    Eosinophils, Percent 0    Basophils, Percent 1    Immature Granulocytes 1    Absolute Neutrophils 5.3    Absolute Lymphocytes 1.6    Absolute Monocytes 0.9    Absolute Eosinophils  0.0    Absolute Basophils 0.0    Absolute Immmature Granulocytes 0.1   COVID/Flu/RSV panel   Result Value    Rapid SARS-COV-2 by PCR Not Detected    Influenza A by PCR Not Detected    Influenza B by PCR Not Detected    RSV BY PCR Not Detected    Isolation Guidelines      Comment: Do not use this test result as the sole decision-maker for discontinuation of isolation.   Clinical evaluation should be considered for other respiratory illness requiring transmission-based isolation.    -    No fever (<99.0 F/37.2 C) for at least 24 hours without the use of fever-reducing medications    AND  -    Respiratory symptoms have improved or resolved (e.g. cough, shortness of breath)     AND  -    COVID-19 negative test    See COVID-19 Deisolation Resource Guide    Procedural Comment      Comment: SARS-COV-2 nucleic acid has not been detected indicating the absence of COVID-19.    This test was performed using the [employer] Xpert Xpress SARS-CoV-2/Flu/RSV RT-PCR test that has been given Emergency Use Authorization (EUA) by the [place] Food and Drug Administration (FDA).  These tests are considered definitive and do not need to be confirmed by another method.   Lactic Acid Venous With Reflex - Respiratory   Result Value    LACTIC ACID, VENOUS - RESPIRATORY 1.6        Imaging Results          XR TIBIA FIBULA 2 VIEWS BILATERAL (Final result)  Result time 02/26/22 22:59:00    Final result                 Impression:      1. No acute  fracture or malalignment of the bilateral tibia and fibula.  2. Nonspecific soft tissue swelling without suspicious soft tissue gas.  3. Additional findings as described above.    Electronically Signed by: PENG GROSS M.D.   Signed on: 2/26/2022 10:59 PM                Narrative:    EXAM: XR TIBIA FIBULA 2 VIEWS BILATERAL    CLINICAL INDICATION: Cellulitis. Pain.    COMPARISON: No comparison was made.    FINDINGS:    RIGHT TIBIA AND FIBULA:    No acute fracture or malalignment of the right tibia and fibula. Possible  healed remote fracture of the mid fibular diaphysis. There is soft tissue  swelling. No suspicious soft tissue gas is seen. Vascular calcifications.    LEFT TIBIA AND FIBULA:    No acute fracture or malalignment of the left tibia and fibula. There is  soft tissue swelling. No suspicious soft tissue gas is seen. Vascular  calcifications.                               XR CHEST PA OR AP 1 VIEW (Final result)  Result time 02/26/22 22:55:47    Final result                 Impression:      1. No focal airspace consolidation or radiographic evidence for fluid  overload.  2. Additional findings as described above.    Electronically Signed by: PENG GROSS M.D.   Signed on: 2/26/2022 10:55 PM                Narrative:    EXAM: XR CHEST PA OR AP 1 VIEW    CLINICAL INDICATION: Shortness of breath.    COMPARISON: Prior chest radiograph dated November 12, 2021.    FINDINGS:    Heart size likely within normal limits. Mildly diminished lung volumes. No  focal airspace consolidation. No significant pleural effusion. No  pneumothorax.                               US [facility] EXTREMITY LOWER VENOUS DUPLEX (Final result)  Result time 02/26/22 22:38:39    Final result                 Impression:      There is no evidence of deep venous thrombosis in the bilateral lower  extremity adequately interrogated deep veins, although the calf veins are  not seen.    Electronically Signed by: PENG GROSS M.D.   Signed on: 2/26/2022  10:38 PM                Narrative:    EXAM: US [facility] LOWER EXTREMITY VENOUS DUPLEX BILATERAL    CLINICAL INDICATION: Bilateral leg swelling.    COMPARISON: November 13, 2021.    TECHNIQUES: Grayscale, color flow Doppler and spectral waveform analysis of  the bilateral lower extremity veins was performed.    FINDINGS:    There is spontaneous and phasic flow to the deep veins of the bilateral  lower extremity. There is normal proximal and distal augmentation of the  Doppler signal. All the visualized deep veins are compressible. The calf  veins are not seen. Therefore, remote possibility of an isolated small calf  vein thrombus cannot be entirely excluded.                                  Medications   vancomycin 1,500 mg in sodium chloride 0.9% 250 mL IVPB (1,500 mg Intravenous New Bag 2/26/22 2300)         Re-Evaluation:     ED Course as of 02/27/22 0008   Sat Feb 26, 2022 2311 No DVT. Vancomycin given. Patient is NOT SEPTIC.    [CL]   2312 Sepsis Documentation      1) Severe Sepsis Identified?No, severe sepsis criteria not met   Now   11:13 PM (patient does not have 2 SIRS)    2) Is Lactate >/= 4 or Hypotension (SBP<90, MAP<65 x2 in 3hrs) Present? No     3) Documentation Caveats:    General Exclusions (Check any that apply): None applicable  Abnormal values:  Doubt bacterial infection as primary cause of: No Applicable    Patient had only 1 value < 65 in our ED and this value was 64 and not in line with other blood pressure measurements.      [CL]   2315 Patient was endorsed to Dr BONIFACIO Nunez [CL]   Shanti Feb 27, 2022   0005 Lactate was drawn 912pm with the rest of the labs [CL]      ED Course User Index  [CL] Heidi Go MD        Procedures     Disposition:     ED Diagnosis        Final diagnosis    Cellulitis of left lower extremity                 No follow-up provider specified.       Summary of your Discharge Medications      You have not been prescribed any medications.            Heidi Go,  MD  2/27/2022     Heidi Go MD  02/27/22 0008     headache

## 2024-01-27 NOTE — ED PROVIDER NOTE - OBJECTIVE STATEMENT
53-year-old male no pertinent past medical history presents for evaluation of headache.  Patient endorses mild right-sided headache described as a cold sensation that began after running in place x 2 weeks ago and has been intermittent since.  No aggravating relieving factors.  Denies fever, neck pain, vision change, numbness, weakness, chest pain, shortness of breath, dysuria, hematuria, vomiting, diarrhea.

## 2024-01-27 NOTE — ED PROVIDER NOTE - CARE PROVIDERS DIRECT ADDRESSES
[email or web address]

## 2024-01-27 NOTE — ED ADULT NURSE NOTE - NSFALLUNIVINTERV_ED_ALL_ED
Bed/Stretcher in lowest position, wheels locked, appropriate side rails in place/Call bell, personal items and telephone in reach/Instruct patient to call for assistance before getting out of bed/chair/stretcher/Non-slip footwear applied when patient is off stretcher/[place] to call system/Physically safe environment - no spills, clutter or unnecessary equipment/Purposeful proactive rounding/Room/bathroom lighting operational, light cord in reach

## 2024-01-27 NOTE — ED PROVIDER NOTE - ATTENDING APP SHARED VISIT CONTRIBUTION OF CARE
43-year-old male without any pertinent past medical history presenting for evaluation of intermittent headache on the right side.  No associated nausea vomiting, fever chills, blurry or double vision, focal weakness or paresthesias, skin rash or any other additional complaints. Pain seems to be radiating from the top of the neck on the right side towards the top of his head.  Well-appearing well-nourished, NAD, head AT/NC, PERRL, pink conjunctivae,  mmm, nml oropharynx, nml phonation without drooling or trismus, No tenderness to palpation of the temporal arteries, No skin rash, supple neck without midline spine ttp or meningeal signs, nml work of breathing, lungs CTA b/l, equal air entry, RRR, well-perfused extremities, distal pulses intact,, no leg edema or unilateral calf swelling, A&Ox3, no focal neuro deficits, nml mood and affect.

## 2024-01-27 NOTE — ED PROVIDER NOTE - NSFOLLOWUPINSTRUCTIONS_ED_ALL_ED_FT
Follow up with PMD and Neurology in 1-2 days.    Headache    A headache is pain or discomfort felt around the head or neck area. The specific cause of a headache may not be found as there are many types including tension headaches, migraine headaches, and cluster headaches. Watch your condition for any changes. Things you can do to manage your pain include taking over the counter and prescription medications as instructed by your health care provider, lying down in a dark quiet room, limiting stress, getting regular sleep, and refraining from alcohol and tobacco products.    SEEK IMMEDIATE MEDICAL CARE IF YOU HAVE ANY OF THE FOLLOWING SYMPTOMS: fever, vomiting, stiff neck, loss of vision, problems with speech, muscle weakness, loss of balance, trouble walking, passing out, or confusion.

## 2024-01-27 NOTE — ED PROVIDER NOTE - CARE PROVIDER_API CALL
Aj Prabhakar  Neurology  [address] [place] [address], [address]  [place] [address]  Phone: [phone]  Fax: [phone]  Follow Up Time:

## 2024-02-05 NOTE — ED ADULT NURSE NOTE - NS ED NOTE  TALK SOMEONE YN
Looks great.  Blood work perfect.  Medications are reviewed and stay the same.  Recheck in 6 months.  
No

## 2024-02-13 ENCOUNTER — APPOINTMENT (OUTPATIENT)
Dept: NEUROLOGY | Facility: CLINIC | Age: 44
End: 2024-02-13

## 2024-02-23 ENCOUNTER — APPOINTMENT (OUTPATIENT)
Dept: NEUROLOGY | Facility: CLINIC | Age: 44
End: 2024-02-23

## 2024-02-26 NOTE — ED PROVIDER NOTE - NSTIMEPROVIDERCAREINITIATE_GEN_ER
"  [facility]        NAME: Jozef West is a 65 y.o. male  : 1958    MRN: [id number]  DATE: 2024  TIME: 8:17 AM    Assessment and Plan   Acute non-recurrent sinusitis, unspecified location [J01.90]  1. Acute non-recurrent sinusitis, unspecified location  amoxicillin-clavulanate (AUGMENTIN) 875-125 mg per tablet      2. Right foot pain  Ambulatory Referral to Podiatry            Patient Instructions   It looks like you may have developed a sinus infection causing postnasal drip and cough.  I am sending in an antibiotic, please take it as prescribed.  I also recommend taking Mucinex (guaifenesin) 600 to 1200 mg twice per day to help keep mucus thin.  Please be sure to stay well-hydrated.    We discussed that your symptoms are not typical of gout as they have been lasting for a month, changing different parts of your foot.  I am providing you with a referral to the foot specialist.  I also recommend following up with your primary care doctor regarding your foot and cough if it is not improving.    Follow up with PCP in 3-5 days.  Proceed to  ER if symptoms worsen.    Chief Complaint     Chief Complaint   Patient presents with    Cough     Patient states that for over a week he has been coughing at night. He notes that he is having trouble sleeping due to productive cough.     Toe Pain     Patient is having right foot toe pain. He states that he has a history of gout and within the past few weeks has been taking ibuprofen Q6 hrs and it is improving swelling and pain but he notes that the pain keeps coming back.          History of Present Illness       Patient presents for 2 concerns today: Cough and right foot pain intermittent for 1 month.    Cough:  Dry mostly, in am he has mucus \"chunks, green\"  No fever, chills, fatigue.  Mucinex DM and cough drops did not help too much.  Humidifier helps some over night. Also using vicks.  No shortness of breath/wheezing. Worse at night with " congestion.    Foot: Started 1 month ago after eating a bag of doritos over 2 days. Started feeling similar to diverticulitis, then after 2 days had a flare of his gout in his big toe of his right foot. Drinking a lot of water. Ibuprofen is very helpful. Takes 800mg at night to sleep. Will take 2 days at a time maximum. Then pain starts again a week later, but he notes that it will travel to different parts of his foot, not always his typical gout.  Currently it is on the top of his foot, medial side.  It is not tender, but he feels with walking.        Review of Systems   Review of Systems   All other systems reviewed and are negative.        Current Medications       Current Outpatient Medications:     amoxicillin-clavulanate (AUGMENTIN) 875-125 mg per tablet, Take 1 tablet by mouth every 12 (twelve) hours for 7 days, Disp: 14 tablet, Rfl: 0    lisinopril (ZESTRIL) 20 mg tablet, Take 1 tablet (20 mg total) by mouth daily, Disp: 90 tablet, Rfl: 3    Current Allergies     Allergies as of 02/26/2024    (No Known Allergies)            The following portions of the patient's history were reviewed and updated as appropriate: allergies, current medications, past family history, past medical history, past social history, past surgical history and problem list.     Past Medical History:   Diagnosis Date    Cutaneous candidiasis 9/20/2019    Gout     Hypertension     Ingrown nail of great toe of right foot 4/4/2019    Left foot pain     Renal calculi        Past Surgical History:   Procedure Laterality Date    DENTAL SURGERY      TONSILLECTOMY         Family History   Problem Relation Age of Onset    No Known Problems Mother     Other Father         epilepsy    No Known Problems Sister     No Known Problems Brother     No Known Problems Son     No Known Problems Daughter          Medications have been verified.        Objective   /84   Pulse 87   Temp 98.1 °F (36.7 °C)   Resp 20   SpO2 98%   No LMP for male  patient.       Physical Exam     Physical Exam  Constitutional:       General: He is not in acute distress.     Appearance: Normal appearance. He is not ill-appearing or toxic-appearing.   HENT:      Head: Normocephalic and atraumatic.      Right Ear: External ear normal.      Left Ear: External ear normal.      Nose: Congestion present.      Mouth/Throat:      Mouth: Mucous membranes are moist.      Comments: Thick PND visualized  Eyes:      Extraocular Movements: Extraocular movements intact.   Cardiovascular:      Rate and Rhythm: Normal rate and regular rhythm.      Heart sounds: Normal heart sounds.   Pulmonary:      Effort: Pulmonary effort is normal. No respiratory distress.      Breath sounds: Normal breath sounds. No stridor. No wheezing, rhonchi or rales.   Musculoskeletal:         General: No swelling, tenderness or deformity.      Comments: Right foot nontender palpation throughout, no significant swelling nor warmth  Normal ROM   Skin:     General: Skin is warm and dry.      Capillary Refill: Capillary refill takes less than 2 seconds.      Findings: No rash.   Neurological:      Mental Status: He is alert.      Gait: Gait normal.   Psychiatric:         Behavior: Behavior normal.                      17-Oct-2020 11:18

## 2024-05-02 ENCOUNTER — APPOINTMENT (OUTPATIENT)
Dept: NEUROLOGY | Facility: CLINIC | Age: 44
End: 2024-05-02

## 2024-10-13 ENCOUNTER — EMERGENCY (EMERGENCY)
Facility: HOSPITAL | Age: 44
LOS: 0 days | Discharge: HOME | End: 2024-10-13
Attending: EMERGENCY MEDICINE
Payer: MEDICAID

## 2024-10-13 VITALS
HEART RATE: 62 BPM | TEMPERATURE: 98 F | DIASTOLIC BLOOD PRESSURE: 76 MMHG | RESPIRATION RATE: 16 BRPM | OXYGEN SATURATION: 98 % | WEIGHT: 164.91 LBS | SYSTOLIC BLOOD PRESSURE: 117 MMHG

## 2024-10-13 DIAGNOSIS — R68.84 JAW PAIN: ICD-10-CM

## 2024-10-13 DIAGNOSIS — Z98.818 OTHER DENTAL PROCEDURE STATUS: ICD-10-CM

## 2024-10-13 DIAGNOSIS — R22.0 LOCALIZED SWELLING, MASS AND LUMP, HEAD: ICD-10-CM

## 2024-10-13 DIAGNOSIS — H92.01 OTALGIA, RIGHT EAR: ICD-10-CM

## 2024-10-13 PROCEDURE — 99283 EMERGENCY DEPT VISIT LOW MDM: CPT

## 2024-10-13 NOTE — ED PROVIDER NOTE - CLINICAL SUMMARY MEDICAL DECISION MAKING FREE TEXT BOX
Presents to the ED for 2 weeks of right sided facial swelling which he refers occurs when he lies down and resolves when he is upright. Patient also refers some generalized tenderness to the right cheek and submandibular area. no fever, chills, agree with above exam    Patient with unclear swelling to the mandibular region could be infectious etiology may be viral parotitis provide antibiotics and patient vies follow-up if does not improve.  No need for imaging or further workup based on current exam now. Presents to the ED for 2 weeks of right sided facial swelling which he refers occurs when he lies down and resolves when he is upright. Patient also refers some generalized tenderness to the right cheek and submandibular area. no fever, chills, agree with above exam    Patient with unclear swelling to the mandibular region could be infectious etiology may be viral parotitis provide antibiotics and patient advised ollow-up if does not improve.  No need for imaging or further workup based on current exam now.

## 2024-10-13 NOTE — ED ADULT NURSE NOTE - NSFALLUNIVINTERV_ED_ALL_ED
Bed/Stretcher in lowest position, wheels locked, appropriate side rails in place/Call bell, personal items and telephone in reach/Instruct patient to call for assistance before getting out of bed/chair/stretcher/Non-slip footwear applied when patient is off stretcher/[place] to call system/Physically safe environment - no spills, clutter or unnecessary equipment/Purposeful proactive rounding/Room/bathroom lighting operational, light cord in reach

## 2024-10-13 NOTE — ED PROVIDER NOTE - NSPTACCESSSVCSAPPTDETAILS_ED_ALL_ED_FT
English speaking male patient. Recent R. ear infection resolved, now complaining of stomatitis. Will benefit from outpatient f/u.

## 2024-10-13 NOTE — ED PROVIDER NOTE - PHYSICAL EXAMINATION
CONSTITUTIONAL: well-appearing, in NAD  SKIN: Warm dry, normal skin turgor  HEAD: NCAT, mild edema of the right buccal area, generalized ttp of right cheek and mandible.   EYES: EOMI, PERRLA, no scleral icterus, conjunctiva pink  ENT: normal pharynx with no erythema or exudates, tympanic membranes clear non-bulging bilaterally,   Mouth: Generally poor dentition. upper and lower dental prosthesis. no erythema. Tolerating oral secretions.   NECK: Supple; non tender. Full ROM. No anterior/posterior cervical lymphadenopathy.   CARD: RRR, no murmurs.  RESP: clear to ausculation b/l. No crackles or wheezing.

## 2024-10-13 NOTE — ED PROVIDER NOTE - NSFOLLOWUPINSTRUCTIONS_ED_ALL_ED_FT
Our Emergency Department Referral Coordinators will be reaching out to you in the next 24-48 hours from 9:00am to 5:00pm (Monday to Friday) with a follow up appointment. Please expect a phone call from the hospital in that time frame. If you do not receive a call or if you have any questions or concerns, you can reach them at [phone]    Stomatitis  Stomatitis is a condition that causes inflammation in the mouth. It can affect all or part of the inside of the mouth. The condition often affects the cheek, teeth, gums, lips, and tongue. Stomatitis can also affect the mucous membranes that surround the inside of the mouth (mucosa).    Pain from stomatitis can make it hard for you to eat or drink. Severe cases of this condition can lead to dehydration or poor nutrition.    What are the causes?  Common causes of this condition include:  Infections caused by:  Viruses. This includes cold sores and shingles.  Bacteria.  Fungus or yeast. This includes oral thrush.  Cancer treatment, including chemotherapy and radiation therapy.  Not getting enough of certain vitamins (vitamin deficiency).  Not getting adequate nutrition.  Injury to the mouth. This can be from:  Dentures or braces that do not fit well.  Biting your tongue or cheek.  Burning your mouth.  Having sharp or broken teeth.  Gum disease.  Using tobacco, especially chewing tobacco.  Allergies to foods, medicines, or substances that are used in your mouth.  Certain medicines.  In some cases, the cause may not be known.    What increases the risk?  You are more likely to develop this condition if:  You have poor oral hygiene or poor nutrition.  You have any condition that weakens the body's defense system (immune system).  You are being treated for cancer.  You use tobacco products.  You have any condition that causes a dry mouth.  You are under a lot of physical or emotional stress.  What are the signs or symptoms?  The most common symptoms of this condition are pain, swelling, and redness inside your mouth. The pain may feel like burning or stinging. It may get worse from eating or drinking. Other symptoms may include:  Painful, shallow sores (ulcers) in the mouth.  White patches in the mouth.  Blisters in the mouth.  Bleeding gums.  Bad breath.  Bad taste in the mouth.  Fever.  How is this diagnosed?  This condition is diagnosed based on a physical exam of your mouth.    You may also have tests, including:  Blood tests to look for infection or vitamin deficiencies.  A mouth swab of a fluid sample to test for bacteria (culture).  A tissue sample from an ulcer to be examined under a microscope (biopsy).  How is this treated?  Treatment for stomatitis depends on the cause. Treatment may include medicines, such as:  Over-the-counter pain medicines or medicines to coat or numb your mouth.  Gel, cream, or spray to numb the area (topical anesthetic) if you have severe pain.  Medicines to treat a bacterial infection (antibiotics).  Medicines to treat a fungal infection (antifungals).  Medicines to treat a viral infection (antivirals).  Vitamins.  Mouth rinses to reduce swelling (steroids).  You may also need to stop or change any medicines that might be causing stomatitis.    Follow these instructions at home:  Medicines    Take over-the-counter and prescription medicines only as told by your health care provider.  If you were prescribed an antibiotic medicine, take it as told by your health care provider. Do not stop taking the antibiotic even if you start to feel better.  Do not use products that contain benzocaine (including numbing gels) to treat teething or mouth pain in children who are younger than 2 years. These products may cause a rare but serious blood condition.  Ask your health care provider if the medicine prescribed to you requires you to avoid driving or using machinery.  Eating and drinking    Eat a balanced diet. Do not eat:  Spicy foods.  Citrus, such as oranges.  Foods that have sharp edges, such as chips.  Avoid any foods or other allergens that you think may be causing your stomatitis.  Do not drink alcohol.  Drink enough fluid to keep your urine pale yellow. This will keep you hydrated.  Lifestyle    Using a toothbrush to brush the front and back sides of the teeth.  A sign showing that a person should not smoke.  Practice good oral hygiene by taking good care of your mouth and teeth:  Gently brush your teeth with a soft toothbrush two times each day.  Floss your teeth every day.  Have your teeth cleaned regularly as recommended by your dental care provider.  If you have dentures, make sure that they are properly fitted. Regularly clean your dentures as told by your dental care provider.  Do not use any products that contain nicotine or tobacco. These products include cigarettes, chewing tobacco, and vaping devices, such as e-cigarettes. If you need help quitting, ask your health care provider.  Find ways to reduce stress. Try yoga or meditation. Ask your health care provider for other ideas.  General instructions    Rinse your mouth with a mixture of salt and water 3–4 times a day or as needed. To make salt water, completely dissolve ½–1 tsp (3–6 g) of salt in 1 cup (237 mL) of warm water.  Keep all follow-up visits. This is important.  Contact a health care provider if:  Your symptoms get worse.  You develop new symptoms, especially:  A rash.  New symptoms that do not involve your mouth area.  Your symptoms last longer than 3 weeks.  Your stomatitis goes away and then returns.  You feel very tired (fatigued) or weak.  You lose your appetite or you feel nauseous.  Get help right away if:  You have a fever.  You are not able to eat or drink.  You have a lot of bleeding in your mouth.  Summary  Stomatitis is a condition that causes inflammation in the mouth.  Pain from stomatitis can make it hard for you to eat or drink. Severe cases of this condition can lead to dehydration or poor nutrition.  Treatment for stomatitis depends on the cause. Treatment may include taking medicines or vitamins, or using a mouth rinse.  Follow instructions from your health care provider about diet and lifestyle changes to help manage your symptoms.

## 2024-10-13 NOTE — ED PROVIDER NOTE - PATIENT PORTAL LINK FT
You can access the FollowMyHealth Patient Portal offered by [facility] by registering at the following website: [email or web address]. By joining [employer]’s FollowMyHealth portal, you will also be able to view your health information using other applications (apps) compatible with our system.

## 2024-10-13 NOTE — ED PROVIDER NOTE - BIRTH SEX
Iron levels ok on recent labs  
Received call from son stating that patient's most recent script for gabapentin sent on 9/7 was only written for 30 capsules. Patient takes 5 capsules/day per sig, so this is a 6 day supply. Will be out tomorrow. Requesting refill with adequate supply.   
Male

## 2024-10-13 NOTE — ED PROVIDER NOTE - OBJECTIVE STATEMENT
Case of a 44 year-old, male patient with PMHx dental extractions and upper/lower dental prosthesis in place. Comes to ED for 2 weeks of right sided facial swelling which he refers occurs when he lies down and resolves when he is upright. Patient also refers some generalized tenderness to the right cheek and submandibular area. Otherwise denies fevers, headache, chills, cp, sob, n/v/d, abd pain, back pain, changes in urinary/stool habitus, calf tenderness or swelling, recent travel, and sick contacts. Patient was seen by his PCP 3 weeks ago for a right sided ear pain and was given ear drops, which resolved the pain. Patient able to eat, drink, and speak without difficulty.

## 2024-10-13 NOTE — ED ADULT TRIAGE NOTE - CHIEF COMPLAINT QUOTE
Patient presents to ED with complaints of R sided facial swelling for 2 weeks. Patient reports he was prescribed drops for  R ear but doesn't feel they helped.

## 2024-10-23 NOTE — CHART NOTE - NSCHARTNOTEFT_GEN_A_CORE
Appt REQUESTED-  10/14. inquiry sent -  10/21/ As per Dinora, "Patient going somewhere else" 10/23 - [facility] (ENT referral)